# Patient Record
Sex: MALE | Race: WHITE | NOT HISPANIC OR LATINO | URBAN - METROPOLITAN AREA
[De-identification: names, ages, dates, MRNs, and addresses within clinical notes are randomized per-mention and may not be internally consistent; named-entity substitution may affect disease eponyms.]

---

## 2019-12-26 PROBLEM — Z00.00 ENCOUNTER FOR PREVENTIVE HEALTH EXAMINATION: Status: ACTIVE | Noted: 2019-12-26

## 2020-01-23 ENCOUNTER — OUTPATIENT (OUTPATIENT)
Dept: OUTPATIENT SERVICES | Facility: HOSPITAL | Age: 65
LOS: 1 days | Discharge: HOME | End: 2020-01-23

## 2020-01-23 ENCOUNTER — APPOINTMENT (OUTPATIENT)
Dept: HEMATOLOGY ONCOLOGY | Facility: CLINIC | Age: 65
End: 2020-01-23
Payer: COMMERCIAL

## 2020-01-23 VITALS
DIASTOLIC BLOOD PRESSURE: 75 MMHG | WEIGHT: 169 LBS | HEART RATE: 70 BPM | HEIGHT: 64 IN | BODY MASS INDEX: 28.85 KG/M2 | RESPIRATION RATE: 16 BRPM | SYSTOLIC BLOOD PRESSURE: 121 MMHG | TEMPERATURE: 97.2 F

## 2020-01-23 DIAGNOSIS — R91.8 OTHER NONSPECIFIC ABNORMAL FINDING OF LUNG FIELD: ICD-10-CM

## 2020-01-23 DIAGNOSIS — L53.8 OTHER SPECIFIED ERYTHEMATOUS CONDITIONS: ICD-10-CM

## 2020-01-23 DIAGNOSIS — Z78.9 OTHER SPECIFIED HEALTH STATUS: ICD-10-CM

## 2020-01-23 DIAGNOSIS — Z87.891 PERSONAL HISTORY OF NICOTINE DEPENDENCE: ICD-10-CM

## 2020-01-23 DIAGNOSIS — Z80.3 FAMILY HISTORY OF MALIGNANT NEOPLASM OF BREAST: ICD-10-CM

## 2020-01-23 DIAGNOSIS — N40.0 BENIGN PROSTATIC HYPERPLASIA WITHOUT LOWER URINARY TRACT SYMPMS: ICD-10-CM

## 2020-01-23 PROCEDURE — 99244 OFF/OP CNSLTJ NEW/EST MOD 40: CPT

## 2020-01-23 NOTE — ASSESSMENT
[FreeTextEntry1] : In summary this is a 64 year old male who presents for eval of a macular rash.\par He also has b/l pulm nodules.\par \par Pt was extensively counselled.\par I don’t believe the skin lesions are ecchymoses.\par He does give a H/o eczema, however I have asked him to go for a derm eval.\par For the pulmonary nodules afollow CT chest in 3 months is recommended.\par RTC in 2 months

## 2020-01-23 NOTE — HISTORY OF PRESENT ILLNESS
[de-identified] : This is a 64 year old male with no sig PMH who is here for eval of easy bruising.\par Over the past 3-4 months pt has noticed easy bruising on trunk and groin. Pt states that the bruises are more noticeable at night before he goes to bed and not as prominent when he wakes up in am.\par Denies trauma. \par No pain.\par No mucosal bleeding.\par Does not take meds.\par He works  as .\par \par \par labs from 12/10/19 show normal CBC, Nl coags and LFTs.\par \par Denies wt loss, night sweats, loss of appetite.\par

## 2020-01-23 NOTE — CONSULT LETTER
[Dear  ___] : Dear  [unfilled], [Please see my note below.] : Please see my note below. [Sincerely,] : Sincerely, [FreeTextEntry3] : Mihir Sterling MD\par Professor [employer]\par Tammie/Mechelle\par Attending Physician\par [facility]\par [phone]\par \par

## 2020-01-23 NOTE — PHYSICAL EXAM
[Fully active, able to carry on all pre-disease performance without restriction] : Status 0 - Fully active, able to carry on all pre-disease performance without restriction [Normal] : affect appropriate [de-identified] : multiple large patches of erythema, which eduardo on pressure on the flanks and inguinal regions.

## 2020-01-28 DIAGNOSIS — L53.8 OTHER SPECIFIED ERYTHEMATOUS CONDITIONS: ICD-10-CM

## 2020-01-28 DIAGNOSIS — R91.8 OTHER NONSPECIFIC ABNORMAL FINDING OF LUNG FIELD: ICD-10-CM

## 2020-04-08 ENCOUNTER — APPOINTMENT (OUTPATIENT)
Dept: HEMATOLOGY ONCOLOGY | Facility: CLINIC | Age: 65
End: 2020-04-08

## 2022-04-04 ENCOUNTER — APPOINTMENT (OUTPATIENT)
Dept: PODIATRY | Facility: CLINIC | Age: 67
End: 2022-04-04

## 2022-07-14 ENCOUNTER — OUTPATIENT (OUTPATIENT)
Dept: OUTPATIENT SERVICES | Facility: HOSPITAL | Age: 67
LOS: 1 days | Discharge: HOME | End: 2022-07-14

## 2022-07-14 ENCOUNTER — APPOINTMENT (OUTPATIENT)
Dept: UROLOGY | Facility: CLINIC | Age: 67
End: 2022-07-14

## 2022-07-14 VITALS
DIASTOLIC BLOOD PRESSURE: 71 MMHG | HEIGHT: 64 IN | TEMPERATURE: 97.7 F | WEIGHT: 174 LBS | HEART RATE: 78 BPM | BODY MASS INDEX: 29.71 KG/M2 | OXYGEN SATURATION: 97 % | SYSTOLIC BLOOD PRESSURE: 112 MMHG

## 2022-07-14 DIAGNOSIS — R10.2 PELVIC AND PERINEAL PAIN: ICD-10-CM

## 2022-07-14 DIAGNOSIS — Z12.5 ENCOUNTER FOR SCREENING FOR MALIGNANT NEOPLASM OF PROSTATE: ICD-10-CM

## 2022-07-14 DIAGNOSIS — N40.1 BENIGN PROSTATIC HYPERPLASIA WITH LOWER URINARY TRACT SYMPTOMS: ICD-10-CM

## 2022-07-14 DIAGNOSIS — N13.8 BENIGN PROSTATIC HYPERPLASIA WITH LOWER URINARY TRACT SYMPMS: ICD-10-CM

## 2022-07-14 DIAGNOSIS — N40.1 BENIGN PROSTATIC HYPERPLASIA WITH LOWER URINARY TRACT SYMPMS: ICD-10-CM

## 2022-07-14 PROCEDURE — 99203 OFFICE O/P NEW LOW 30 MIN: CPT

## 2022-07-14 NOTE — HISTORY OF PRESENT ILLNESS
[FreeTextEntry1] : 67 year old male with PMH of BPH who present with urinary retention for 6 months duration.\par States a previous history of BPH diagnosed at outside facility, approximately four years ago. States polyuria/dysuria has occurred for a long time but was able to deal with, recently has increased in severity causing visit. Endorses increased feeling of retention and urgency in the last two weeks. States the takes up to 30 seconds to start stream and stream is weak in nature. States pain is in the perineal area, intermittent in nature, describes the pain as a "pulling and burning feeling". Recently prescribed medication for knee pain, unsure of name of medication. Denies presence of discharge, hematuria. Denies history of STI, UTI, family of prostate cancer. \par \par KALYANI shows: prostate is about 40g, and the pelvic muscles are tender\par \par used to take flomax -- thinks it helps \par \par \par  \par \par \par

## 2022-07-14 NOTE — ASSESSMENT
[FreeTextEntry1] : 67 year old male with PMH of BPH who present with urinary retention for 6 months duration.\par States a previous history of BPH diagnosed at outside facility, approximately four years ago. States polyuria/dysuria has occurred for a long time but was able to deal with, recently has increased in severity causing visit. Endorses increased feeling of retention and urgency in the last two weeks. States the takes up to 30 seconds to start stream and stream is weak in nature. States pain is in the perineal area, intermittent in nature, describes the pain as a "pulling and burning feeling". Recently prescribed medication for knee pain, unsure of name of medication. Denies presence of discharge, hematuria. Denies history of STI, UTI, family of prostate cancer. \par \par KALYANI shows: prostate is about 40g, and the pelvic muscles are tender\par \par used to take flomax -- thinks it helps \par \par \par  \par

## 2022-07-26 ENCOUNTER — OUTPATIENT (OUTPATIENT)
Dept: OUTPATIENT SERVICES | Facility: HOSPITAL | Age: 67
LOS: 1 days | Discharge: HOME | End: 2022-07-26

## 2022-07-26 DIAGNOSIS — N40.1 BENIGN PROSTATIC HYPERPLASIA WITH LOWER URINARY TRACT SYMPTOMS: ICD-10-CM

## 2022-07-26 PROCEDURE — 76857 US EXAM PELVIC LIMITED: CPT | Mod: 26

## 2022-10-03 ENCOUNTER — NON-APPOINTMENT (OUTPATIENT)
Age: 67
End: 2022-10-03

## 2022-10-24 ENCOUNTER — APPOINTMENT (OUTPATIENT)
Dept: CARDIOLOGY | Facility: CLINIC | Age: 67
End: 2022-10-24

## 2022-10-24 VITALS
TEMPERATURE: 97.6 F | BODY MASS INDEX: 28.51 KG/M2 | HEIGHT: 64 IN | HEART RATE: 76 BPM | RESPIRATION RATE: 16 BRPM | DIASTOLIC BLOOD PRESSURE: 70 MMHG | SYSTOLIC BLOOD PRESSURE: 126 MMHG | WEIGHT: 167 LBS

## 2022-10-24 DIAGNOSIS — E78.5 HYPERLIPIDEMIA, UNSPECIFIED: ICD-10-CM

## 2022-10-24 DIAGNOSIS — R00.2 PALPITATIONS: ICD-10-CM

## 2022-10-24 DIAGNOSIS — Z82.49 FAMILY HISTORY OF ISCHEMIC HEART DISEASE AND OTHER DISEASES OF THE CIRCULATORY SYSTEM: ICD-10-CM

## 2022-10-24 PROCEDURE — 93000 ELECTROCARDIOGRAM COMPLETE: CPT

## 2022-10-24 PROCEDURE — 99204 OFFICE O/P NEW MOD 45 MIN: CPT | Mod: 25

## 2022-10-24 RX ORDER — ROSUVASTATIN CALCIUM 5 MG/1
5 TABLET, FILM COATED ORAL
Qty: 90 | Refills: 3 | Status: ACTIVE | COMMUNITY
Start: 2022-10-24 | End: 1900-01-01

## 2022-10-24 RX ORDER — TAMSULOSIN HYDROCHLORIDE 0.4 MG/1
0.4 CAPSULE ORAL
Qty: 90 | Refills: 3 | Status: DISCONTINUED | COMMUNITY
Start: 2022-07-14 | End: 2022-10-24

## 2022-10-24 NOTE — PHYSICAL EXAM
[Normal] : no edema, no cyanosis, no clubbing, no varicosities [Normal PT B/L] : normal PT B/L [de-identified] : diminished breath sounds throughout

## 2022-10-24 NOTE — ASSESSMENT
[FreeTextEntry1] : 67 y.o. male with PMH of hyperlipidemia and FH of CAD.\par Episodes of chest pain and BENNETT.\par \par Plan:\par Start Rosuvastatin 5 mg daily.\par Exercise stress echocardiogram for evaluation of patient's symptoms.\par Will need repeat blood work in 2-3 months.\par \par Yossi Mckeon MD\par

## 2022-10-24 NOTE — HISTORY OF PRESENT ILLNESS
[FreeTextEntry1] : 67 y.o. male with complains of dyspnea on minimal exertion, palpitations, chest discomfort related to physical activity. Patient reports that symptoms started in 2022, after COVID ( 2022). Patient also had Lyme disease around the same time, was diagnosed in September and completed course of antibiotics. He exercises daily, is able to run for 3 miles. PMH  is significant for hyperlipidemia in the past and FH of CAD, father  from MI at the age of 55. \par \par , , trig 203

## 2022-10-24 NOTE — REVIEW OF SYSTEMS
[Dyspnea on exertion] : dyspnea during exertion [Chest Discomfort] : chest discomfort [Palpitations] : palpitations [Negative] : Neurological [Blurry Vision] : no blurred vision [Anxiety] : no anxiety

## 2022-10-25 ENCOUNTER — RESULT CHARGE (OUTPATIENT)
Age: 67
End: 2022-10-25

## 2022-10-26 ENCOUNTER — TRANSCRIPTION ENCOUNTER (OUTPATIENT)
Age: 67
End: 2022-10-26

## 2022-11-21 ENCOUNTER — APPOINTMENT (OUTPATIENT)
Dept: CARDIOLOGY | Facility: CLINIC | Age: 67
End: 2022-11-21

## 2022-11-21 DIAGNOSIS — R07.9 CHEST PAIN, UNSPECIFIED: ICD-10-CM

## 2022-11-21 DIAGNOSIS — R06.00 DYSPNEA, UNSPECIFIED: ICD-10-CM

## 2022-11-21 PROCEDURE — 93306 TTE W/DOPPLER COMPLETE: CPT | Mod: 59

## 2022-11-21 PROCEDURE — 93351 STRESS TTE COMPLETE: CPT

## 2023-03-02 LAB
APPEARANCE: CLEAR
BACTERIA UR CULT: NORMAL
BILIRUBIN URINE: NEGATIVE
BLOOD URINE: NEGATIVE
COLOR: NORMAL
GLUCOSE QUALITATIVE U: NEGATIVE
KETONES URINE: NEGATIVE
LEUKOCYTE ESTERASE URINE: NEGATIVE
NITRITE URINE: NEGATIVE
PH URINE: 6
PROTEIN URINE: NORMAL
PSA FREE FLD-MCNC: 20 %
PSA FREE SERPL-MCNC: 0.31 NG/ML
PSA SERPL-MCNC: 1.56 NG/ML
SPECIFIC GRAVITY URINE: 1.01
UROBILINOGEN URINE: NORMAL

## 2023-03-09 ENCOUNTER — APPOINTMENT (OUTPATIENT)
Dept: UROLOGY | Facility: CLINIC | Age: 68
End: 2023-03-09

## 2023-03-09 ENCOUNTER — OUTPATIENT (OUTPATIENT)
Dept: OUTPATIENT SERVICES | Facility: HOSPITAL | Age: 68
LOS: 1 days | End: 2023-03-09

## 2023-03-09 VITALS
TEMPERATURE: 97.8 F | HEART RATE: 72 BPM | BODY MASS INDEX: 29.37 KG/M2 | DIASTOLIC BLOOD PRESSURE: 77 MMHG | WEIGHT: 172 LBS | OXYGEN SATURATION: 97 % | SYSTOLIC BLOOD PRESSURE: 134 MMHG | HEIGHT: 64 IN

## 2023-03-09 DIAGNOSIS — Z00.00 ENCOUNTER FOR GENERAL ADULT MEDICAL EXAMINATION WITHOUT ABNORMAL FINDINGS: ICD-10-CM

## 2025-01-15 ENCOUNTER — NON-APPOINTMENT (OUTPATIENT)
Age: 70
End: 2025-01-15

## 2025-02-25 ENCOUNTER — NON-APPOINTMENT (OUTPATIENT)
Age: 70
End: 2025-02-25

## 2025-04-03 ENCOUNTER — APPOINTMENT (OUTPATIENT)
Dept: OTOLARYNGOLOGY | Facility: CLINIC | Age: 70
End: 2025-04-03